# Patient Record
Sex: FEMALE | Race: WHITE | NOT HISPANIC OR LATINO
[De-identification: names, ages, dates, MRNs, and addresses within clinical notes are randomized per-mention and may not be internally consistent; named-entity substitution may affect disease eponyms.]

---

## 2019-02-20 ENCOUNTER — LABORATORY RESULT (OUTPATIENT)
Age: 24
End: 2019-02-20

## 2019-02-20 ENCOUNTER — NON-APPOINTMENT (OUTPATIENT)
Age: 24
End: 2019-02-20

## 2019-02-20 ENCOUNTER — APPOINTMENT (OUTPATIENT)
Dept: INTERNAL MEDICINE | Facility: CLINIC | Age: 24
End: 2019-02-20
Payer: COMMERCIAL

## 2019-02-20 VITALS
OXYGEN SATURATION: 99 % | HEART RATE: 97 BPM | WEIGHT: 111 LBS | RESPIRATION RATE: 16 BRPM | DIASTOLIC BLOOD PRESSURE: 80 MMHG | HEIGHT: 64 IN | BODY MASS INDEX: 18.95 KG/M2 | TEMPERATURE: 97.9 F | SYSTOLIC BLOOD PRESSURE: 118 MMHG

## 2019-02-20 DIAGNOSIS — F41.9 ANXIETY DISORDER, UNSPECIFIED: ICD-10-CM

## 2019-02-20 DIAGNOSIS — R06.00 DYSPNEA, UNSPECIFIED: ICD-10-CM

## 2019-02-20 DIAGNOSIS — Z00.00 ENCOUNTER FOR GENERAL ADULT MEDICAL EXAMINATION W/OUT ABNORMAL FINDINGS: ICD-10-CM

## 2019-02-20 DIAGNOSIS — L65.9 NONSCARRING HAIR LOSS, UNSPECIFIED: ICD-10-CM

## 2019-02-20 DIAGNOSIS — R53.83 OTHER FATIGUE: ICD-10-CM

## 2019-02-20 LAB
BILIRUB UR QL STRIP: NORMAL
CLARITY UR: CLEAR
COLLECTION METHOD: NORMAL
GLUCOSE UR-MCNC: NEGATIVE
HCG UR QL: 0.2 EU/DL
HGB UR QL STRIP.AUTO: NEGATIVE
KETONES UR-MCNC: NEGATIVE
LEUKOCYTE ESTERASE UR QL STRIP: NEGATIVE
NITRITE UR QL STRIP: NEGATIVE
PH UR STRIP: 6.5
PROT UR STRIP-MCNC: NEGATIVE
SP GR UR STRIP: 1.02

## 2019-02-20 PROCEDURE — 94010 BREATHING CAPACITY TEST: CPT

## 2019-02-20 PROCEDURE — 36415 COLL VENOUS BLD VENIPUNCTURE: CPT

## 2019-02-20 PROCEDURE — 81003 URINALYSIS AUTO W/O SCOPE: CPT | Mod: QW

## 2019-02-20 PROCEDURE — 99385 PREV VISIT NEW AGE 18-39: CPT | Mod: 25

## 2019-02-20 PROCEDURE — 93000 ELECTROCARDIOGRAM COMPLETE: CPT

## 2019-02-20 NOTE — PHYSICAL EXAM

## 2019-02-20 NOTE — ASSESSMENT
[FreeTextEntry1] : New patient is a 23 year old female with a past medical history as above who presents for annual physical exam and feelings of fatigue, hair loss, and LLQ pain. Patient is currently experiencing stressful life events that may be contributing to her symptoms.

## 2019-02-20 NOTE — ADDENDUM
[FreeTextEntry1] : I, Jailyn Javier, acted solely as scribe for Dr. Mike Gonzalez DO on this date Feb 20 2019 11:00AM .\par \par All medical record entries made by the Scribe were at my, Dr. Mike Gonzalez DO direction and personally dictated by me on Feb 20 2019 11:00AM . I have reviewed the chart and agree that the record accurately reflects my personal performance of the history, physical exam, assessment and plan. I have also personally directed, reviewed and agreed with the chart.

## 2019-02-20 NOTE — HISTORY OF PRESENT ILLNESS
[FreeTextEntry1] : New patient physical exam, fatigue, LLQ pain, hair loss [de-identified] : New patient is a 23 year old female with a past medical history as below presents for annual physical exam and feeling ill. Patient is a  at [employer] for her masters degree. She is not on any medications. Patient denies recent flu shot. She has had a tetanus shot Sept 2017. Last gynecologist visit was last year.  Patient complains of chronic fatigue. She states significant hair loss seen on her brush, drain and when running her hands through her hair that may be secondary to stress. She states the hair loss caused her to cut her hair. She states LLQ abdominal pain that is constant, characterized as either dull/achey or sharp. Patient is not sexually active. She reports these symptoms have been occurring for 1 month. Patient states insomnia but reports sleeping 7-9 hours. Patient states she skipped meals with diminished appetite due to her busy schedule and lost 15 pounds in one week. However she states she eats healthy and organic foods overall. Patient states she does not exercise frequently but does yoga. CT A/P was negative except for right kidney and right ovarian cyst.  Pelvic sonogram was normal. Recent blood work shows normal sugar and liver tests, and ferritin 8, iodine 39. She is taking b12, iron and Vitamin D supplements.

## 2019-02-20 NOTE — REVIEW OF SYSTEMS
[Negative] : Heme/Lymph [Fatigue] : fatigue [Hair Changes] : hair changes [Abdominal Pain] : abdominal pain [Anxiety] : anxiety [de-identified] : significant hair loss, tingling feeling on scalp

## 2019-02-21 LAB
25(OH)D3 SERPL-MCNC: 41.4 NG/ML
ALBUMIN SERPL ELPH-MCNC: 4.9 G/DL
ALP BLD-CCNC: 60 U/L
ALT SERPL-CCNC: 8 U/L
AST SERPL-CCNC: 12 U/L
BASOPHILS # BLD AUTO: 0.02 K/UL
BASOPHILS NFR BLD AUTO: 0.4 %
BILIRUB SERPL-MCNC: 0.5 MG/DL
BUN SERPL-MCNC: 10 MG/DL
CALCIUM SERPL-MCNC: 10.5 MG/DL
CHLORIDE SERPL-SCNC: 104 MMOL/L
CHOLEST SERPL-MCNC: 260 MG/DL
CHOLEST/HDLC SERPL: 2.7 RATIO
CO2 SERPL-SCNC: 24 MMOL/L
CREAT SERPL-MCNC: 0.56 MG/DL
EOSINOPHIL # BLD AUTO: 0.03 K/UL
EOSINOPHIL NFR BLD AUTO: 0.6 %
FOLATE SERPL-MCNC: 14.8 NG/ML
GGT SERPL-CCNC: 11 U/L
GLUCOSE SERPL-MCNC: 90 MG/DL
HCG SERPL QL: NEGATIVE
HCT VFR BLD CALC: 48 %
HCT VFR BLD CALC: 48 %
HDLC SERPL-MCNC: 98 MG/DL
HGB BLD-MCNC: 14.6 G/DL
HGB BLD-MCNC: 14.6 G/DL
IMM GRANULOCYTES NFR BLD AUTO: 0.4 %
IRON SATN MFR SERPL: 11 %
IRON SERPL-MCNC: 48 UG/DL
LDH SERPL-CCNC: 147 U/L
LDLC SERPL CALC-MCNC: 151 MG/DL
LYMPHOCYTES # BLD AUTO: 1.19 K/UL
LYMPHOCYTES NFR BLD AUTO: 22.5 %
MAGNESIUM SERPL-MCNC: 2.1 MG/DL
MAN DIFF?: NORMAL
MCHC RBC-ENTMCNC: 29.9 PG
MCHC RBC-ENTMCNC: 30.4 GM/DL
MCV RBC AUTO: 98.2 FL
MONOCYTES # BLD AUTO: 0.41 K/UL
MONOCYTES NFR BLD AUTO: 7.8 %
NEUTROPHILS # BLD AUTO: 3.62 K/UL
NEUTROPHILS NFR BLD AUTO: 68.3 %
PAPP-A SERPL-ACNC: <1 MIU/ML
PHOSPHATE SERPL-MCNC: 3.7 MG/DL
PLATELET # BLD AUTO: 264 K/UL
POTASSIUM SERPL-SCNC: 4.2 MMOL/L
PROT SERPL-MCNC: 7.7 G/DL
RBC # BLD: 4.89 M/UL
RBC # FLD: 14.2 %
SODIUM SERPL-SCNC: 142 MMOL/L
T3 SERPL-MCNC: 104 NG/DL
T3RU NFR SERPL: 1 TBI
T4 SERPL-MCNC: 7.1 UG/DL
TIBC SERPL-MCNC: 420 UG/DL
TRIGL SERPL-MCNC: 57 MG/DL
TSH SERPL-ACNC: 1.06 UIU/ML
UIBC SERPL-MCNC: 372 UG/DL
URATE SERPL-MCNC: 5.3 MG/DL
VIT B12 SERPL-MCNC: 839 PG/ML
WBC # FLD AUTO: 5.29 K/UL

## 2019-02-22 LAB
24R-OH-CALCIDIOL SERPL-MCNC: 64.9 PG/ML
B BURGDOR IGG+IGM SER QL IB: NORMAL
DEPRECATED KAPPA LC FREE/LAMBDA SER: 0.8 RATIO
EBV EA AB SER IA-ACNC: <5 U/ML
EBV EA AB TITR SER IF: POSITIVE
EBV EA IGG SER QL IA: 586 U/ML
EBV EA IGG SER-ACNC: NEGATIVE
EBV EA IGM SER IA-ACNC: NEGATIVE
EBV PATRN SPEC IB-IMP: NORMAL
EBV VCA IGG SER IA-ACNC: 131 U/ML
EBV VCA IGM SER QL IA: <10 U/ML
EPSTEIN-BARR VIRUS CAPSID ANTIGEN IGG: POSITIVE
HSV 1+2 IGG SER IA-IMP: NEGATIVE
HSV 1+2 IGG SER IA-IMP: NEGATIVE
HSV1 IGG SER QL: <0.01 INDEX
HSV2 IGG SER QL: 0.09 INDEX
IGA SER QL IEP: 225 MG/DL
IGG SER QL IEP: 1060 MG/DL
IGM SER QL IEP: 164 MG/DL
KAPPA LC CSF-MCNC: 1.06 MG/DL
KAPPA LC SERPL-MCNC: 0.85 MG/DL

## 2019-02-28 LAB
ANA SER IF-ACNC: NEGATIVE
DHEA-SULFATE, SERUM: 463 UG/DL
RPR SER-TITR: NORMAL
TESTOST BND SERPL-MCNC: 1.9 PG/ML
TESTOST SERPL-MCNC: 44 NG/DL
UBIQUINONE10 SERPL-MCNC: 0.82 MG/L
VIT A SERPL-MCNC: 37.5 UG/DL
VIT B1 SERPL-MCNC: 124.1 NMOL/L
VIT C SERPL-MCNC: 0.9 MG/DL

## 2019-04-09 ENCOUNTER — APPOINTMENT (OUTPATIENT)
Dept: OBGYN | Facility: CLINIC | Age: 24
End: 2019-04-09
Payer: COMMERCIAL

## 2019-04-09 VITALS
HEIGHT: 64 IN | WEIGHT: 113 LBS | SYSTOLIC BLOOD PRESSURE: 112 MMHG | DIASTOLIC BLOOD PRESSURE: 60 MMHG | BODY MASS INDEX: 19.29 KG/M2

## 2019-04-09 DIAGNOSIS — Z78.9 OTHER SPECIFIED HEALTH STATUS: ICD-10-CM

## 2019-04-09 PROCEDURE — 99213 OFFICE O/P EST LOW 20 MIN: CPT | Mod: 25

## 2019-04-09 PROCEDURE — 99395 PREV VISIT EST AGE 18-39: CPT

## 2019-04-09 RX ORDER — FLUCONAZOLE 150 MG/1
150 TABLET ORAL
Qty: 3 | Refills: 3 | Status: ACTIVE | COMMUNITY
Start: 2019-04-09 | End: 1900-01-01

## 2019-04-09 NOTE — PHYSICAL EXAM
[Awake] : awake [Acute Distress] : no acute distress [Alert] : alert [Nipple Discharge] : no nipple discharge [Mass] : no breast mass [Axillary LAD] : no axillary lymphadenopathy [Tender] : non tender [Soft] : soft [Oriented x3] : oriented to person, place, and time [No Bleeding] : there was no active vaginal bleeding [Normal] : uterus [FreeTextEntry4] : possible yeast [Uterine Adnexae] : were not tender and not enlarged

## 2019-04-09 NOTE — DISCUSSION/SUMMARY
[FreeTextEntry1] : a23 years old white female infant office for annual exam patient was on vacation had X. and wishes to be tested for STDs physical exam within normal limits pelvic exam within normal limits possible yeast cultures Pap smear done in with chief according to culture results due to the patient's sexual behavior advised HPV vaccination patient refused I spent 35 minutes of his

## 2019-04-10 LAB
C TRACH RRNA SPEC QL NAA+PROBE: NOT DETECTED
HPV HIGH+LOW RISK DNA PNL CVX: NOT DETECTED
N GONORRHOEA RRNA SPEC QL NAA+PROBE: NOT DETECTED
SOURCE TP AMPLIFICATION: NORMAL

## 2019-04-13 LAB
CANDIDA VAG CYTO: NOT DETECTED
CYTOLOGY CVX/VAG DOC THIN PREP: NORMAL
G VAGINALIS+PREV SP MTYP VAG QL MICRO: NOT DETECTED
T VAGINALIS VAG QL WET PREP: NOT DETECTED

## 2019-05-21 ENCOUNTER — APPOINTMENT (OUTPATIENT)
Dept: OBGYN | Facility: CLINIC | Age: 24
End: 2019-05-21
Payer: COMMERCIAL

## 2019-05-21 VITALS
WEIGHT: 113 LBS | BODY MASS INDEX: 19.29 KG/M2 | HEIGHT: 64 IN | DIASTOLIC BLOOD PRESSURE: 70 MMHG | SYSTOLIC BLOOD PRESSURE: 110 MMHG

## 2019-05-21 PROCEDURE — 99213 OFFICE O/P EST LOW 20 MIN: CPT

## 2019-05-21 NOTE — DISCUSSION/SUMMARY
[FreeTextEntry1] : a23 female came to the office years old white female urgent visit the patient states that the car number and wants to get this get tested for std discussed plan b with the patient she stated that her ejaculation  occurred physical and pelvic exam within normal limits cultures done we'll treat the patient accordingly also discussed the need to use a vaginal spermicidal gel and not through I. on the condom alone and is in 25 minutes with the patient

## 2019-05-23 LAB
C TRACH RRNA SPEC QL NAA+PROBE: NOT DETECTED
CANDIDA VAG CYTO: NOT DETECTED
G VAGINALIS+PREV SP MTYP VAG QL MICRO: DETECTED
N GONORRHOEA RRNA SPEC QL NAA+PROBE: NOT DETECTED
SOURCE AMPLIFICATION: NORMAL
T VAGINALIS VAG QL WET PREP: NOT DETECTED

## 2019-05-23 RX ORDER — METRONIDAZOLE 500 MG/1
500 TABLET ORAL
Qty: 15 | Refills: 4 | Status: ACTIVE | COMMUNITY
Start: 2019-05-23 | End: 1900-01-01

## 2019-05-26 LAB — BACTERIA GENITAL AEROBE CULT: NORMAL

## 2019-07-02 ENCOUNTER — APPOINTMENT (OUTPATIENT)
Dept: OBGYN | Facility: CLINIC | Age: 24
End: 2019-07-02
Payer: COMMERCIAL

## 2019-07-02 VITALS
SYSTOLIC BLOOD PRESSURE: 98 MMHG | DIASTOLIC BLOOD PRESSURE: 56 MMHG | BODY MASS INDEX: 18.78 KG/M2 | HEIGHT: 64 IN | WEIGHT: 110 LBS

## 2019-07-02 PROCEDURE — 99214 OFFICE O/P EST MOD 30 MIN: CPT

## 2019-07-02 RX ORDER — CEPHALEXIN 500 MG/1
500 CAPSULE ORAL
Qty: 20 | Refills: 0 | Status: ACTIVE | COMMUNITY
Start: 2019-06-09

## 2019-07-02 RX ORDER — METRONIDAZOLE 500 MG/1
500 TABLET ORAL
Qty: 15 | Refills: 4 | Status: ACTIVE | COMMUNITY
Start: 2019-07-02 | End: 1900-01-01

## 2019-07-02 RX ORDER — NYSTATIN AND TRIAMCINOLONE ACETONIDE 100000; 1 MG/G; MG/G
100000-0.1 CREAM TOPICAL TWICE DAILY
Qty: 1 | Refills: 3 | Status: ACTIVE | COMMUNITY
Start: 2019-07-02 | End: 1900-01-01

## 2019-07-02 RX ORDER — MUPIROCIN 20 MG/G
2 OINTMENT TOPICAL
Qty: 22 | Refills: 0 | Status: ACTIVE | COMMUNITY
Start: 2019-06-17

## 2019-07-02 RX ORDER — CIPROFLOXACIN HYDROCHLORIDE 500 MG/1
500 TABLET, FILM COATED ORAL
Qty: 20 | Refills: 0 | Status: ACTIVE | COMMUNITY
Start: 2019-06-21

## 2019-07-02 RX ORDER — AMOXICILLIN 500 MG/1
500 CAPSULE ORAL
Qty: 20 | Refills: 0 | Status: ACTIVE | COMMUNITY
Start: 2019-05-29

## 2019-07-02 RX ORDER — PHENAZOPYRIDINE HYDROCHLORIDE 100 MG/1
100 TABLET ORAL
Qty: 9 | Refills: 0 | Status: ACTIVE | COMMUNITY
Start: 2019-06-21

## 2019-07-02 NOTE — DISCUSSION/SUMMARY
[FreeTextEntry1] : A 23 years white female in the office again urgent visit because she found out her boyfriend is not faithful chest done within the patient from work until accordingly irritation of the rectal area will place on nystatin creem again the patient about what was using protection I spent 25 minutes the patient

## 2019-07-02 NOTE — PHYSICAL EXAM
[Normal] : cervix [No Bleeding] : there was no active vaginal bleeding [Uterine Adnexae] : were not tender and not enlarged [FreeTextEntry4] : possible bv

## 2019-07-08 LAB — HHV SPEC CULT: NORMAL

## 2019-08-06 ENCOUNTER — APPOINTMENT (OUTPATIENT)
Dept: OBGYN | Facility: CLINIC | Age: 24
End: 2019-08-06

## 2021-09-15 NOTE — PLAN
Patient presents after one episode of bright red blood in her stool yesterday evening. Patient reports that she is nauseated this morning. Patient reports that she has noticed blood \"when wiping several days in a row\". Patient denies a history of having hemorrhoids.   
[FreeTextEntry1] : \par fatigue - check serologic and vitamin panel - add multivitamin 1 pill q.d. to current supplements - concentrate on eating a balanced diet with 3 meals plus snacks - consider nutritionist if need\par anxiety - if no medical causes, work on stress reduction (ex: yoga)\par Endocrinology\par hair loss - check hair loss panel - start biotin - if no medical causes, will refer to dermatology\par left pelvic pain - follow up with gynecologist for annual and pelvic sonogram - checking pregnancy test\par  gastrointestinal\par left pelvic pain-referred to GYN for annual exam and pelvic sonogram\par psychiatry\par anxiety-possibly significant underlying component